# Patient Record
Sex: MALE | Race: BLACK OR AFRICAN AMERICAN
[De-identification: names, ages, dates, MRNs, and addresses within clinical notes are randomized per-mention and may not be internally consistent; named-entity substitution may affect disease eponyms.]

---

## 2019-06-05 ENCOUNTER — HOSPITAL ENCOUNTER (EMERGENCY)
Dept: HOSPITAL 25 - UCCORT | Age: 19
Discharge: HOME | End: 2019-06-05
Payer: SELF-PAY

## 2019-06-05 VITALS — DIASTOLIC BLOOD PRESSURE: 63 MMHG | SYSTOLIC BLOOD PRESSURE: 123 MMHG

## 2019-06-05 DIAGNOSIS — L05.01: Primary | ICD-10-CM

## 2019-06-05 PROCEDURE — 87205 SMEAR GRAM STAIN: CPT

## 2019-06-05 PROCEDURE — 87641 MR-STAPH DNA AMP PROBE: CPT

## 2019-06-05 PROCEDURE — 87640 STAPH A DNA AMP PROBE: CPT

## 2019-06-05 PROCEDURE — 87186 SC STD MICRODIL/AGAR DIL: CPT

## 2019-06-05 PROCEDURE — 87070 CULTURE OTHR SPECIMN AEROBIC: CPT

## 2019-06-05 PROCEDURE — 87077 CULTURE AEROBIC IDENTIFY: CPT

## 2019-06-05 PROCEDURE — G0463 HOSPITAL OUTPT CLINIC VISIT: HCPCS

## 2019-06-05 PROCEDURE — 99202 OFFICE O/P NEW SF 15 MIN: CPT

## 2019-06-05 NOTE — UC
Skin Complaint HPI





- HPI Summary


HPI Summary: 


18-year-old male comes in with a chief complaint of tailbone pain.  Been going 

on for about 10 days.  2 days ago it started to drain pus.  Pains decreased 

ever since the drainage has occurred.  No fevers or chills.  Feels well 

otherwise.  Patient has not had this kind of an episode in the past.





- History of Current Complaint


Chief Complaint: UCSkin


Time Seen by Provider: 06/05/19 10:49


Stated Complaint: TAILBONE PAIN


Pain Intensity: 4





- Allergy/Home Medications


Allergies/Adverse Reactions: 


 Allergies











Allergy/AdvReac Type Severity Reaction Status Date / Time


 


No Known Allergies Allergy   Verified 06/05/19 10:50














PMH/Surg Hx/FS Hx/Imm Hx


Previously Healthy: Yes





- Surgical History


Surgical History: Yes


Surgery Procedure, Year, and Place: wisdom teeth extraction





- Family History


Known Family History: Positive: Non-Contributory





- Social History


Alcohol Use: Rare


Substance Use Type: Marijuana


Substance Use Comment - Amount & Last Used: occasionally


Smoking Status (MU): Never Smoked Tobacco





- Immunization History


Most Recent Influenza Vaccination: 2012


Most Recent Tetanus Shot: unsure





Review of Systems


All Other Systems Reviewed And Are Negative: Yes


Constitutional: Positive: Negative


Skin: Positive: Other - SEE HPI


Eyes: Positive: Negative


ENT: Positive: Negative


Respiratory: Positive: Negative


Cardiovascular: Positive: Negative


Gastrointestinal: Positive: Negative


Motor: Positive: Negative


Neurovascular: Positive: Negative


Musculoskeletal: Positive: Negative


Neurological: Positive: Negative


Psychological: Positive: Negative


Is Patient Immunocompromised?: No





Physical Exam


Triage Information Reviewed: Yes


Appearance: Well-Appearing, No Pain Distress, Well-Nourished


Vital Signs: 


 Initial Vital Signs











Temp  98.2 F   06/05/19 10:44


 


Pulse  95   06/05/19 10:44


 


Resp  18   06/05/19 10:44


 


BP  123/63   06/05/19 10:44


 


Pulse Ox  98   06/05/19 10:44











Vital Signs Reviewed: Yes


Eye Exam: Normal


Eyes: Positive: Conjunctiva Clear


Neck: Positive: Supple


Respiratory: Positive: No respiratory distress


Musculoskeletal: Positive: Strength Intact, ROM Intact


Neurological: Positive: Alert, Muscle Tone Normal


Psychological: Positive: Age Appropriate Behavior


Skin: Positive: Other - UPPER GLUTEAL FOLD ON RIGHT 3MM DRAINING WOUND. SEROUS 

FLUID. CULTURE TAKEN. NO CELLULITIS SEEN ON EXAM.





Course/Dx





- Course


Course Of Treatment: 


NO FLUCTUANCE FOR I & D





- Diagnoses


Provider Diagnosis: 


 Pilonidal cyst with abscess








Discharge





- Sign-Out/Discharge


Documenting (check all that apply): Patient Departure


All imaging exams completed and their final reports reviewed: No Studies





- Discharge Plan


Condition: Stable


Disposition: HOME


Prescriptions: 


Cephalexin CAP* [Keflex CAP*] 500 mg PO QID #40 cap


Patient Education Materials:  Pilonidal Cyst (ED)


Referrals: 


Alfonzo Cowart MD [Primary Care Provider] - 


Additional Instructions: 


FOLLOW UP WITH YOUR DOCTOR.


GET RECHECKED SOONER IF YOUR CONDITION WORSENS; FEVER, YOU FEEL ILL OR ANY 

QUESTIONS OR CONCERNS.








- Billing Disposition and Condition


Condition: STABLE


Disposition: Home